# Patient Record
Sex: MALE | Race: WHITE | NOT HISPANIC OR LATINO | Employment: FULL TIME | URBAN - METROPOLITAN AREA
[De-identification: names, ages, dates, MRNs, and addresses within clinical notes are randomized per-mention and may not be internally consistent; named-entity substitution may affect disease eponyms.]

---

## 2021-06-01 ENCOUNTER — HOSPITAL ENCOUNTER (OUTPATIENT)
Dept: LAB | Facility: HOSPITAL | Age: 31
Discharge: HOME | End: 2021-06-01
Attending: STUDENT IN AN ORGANIZED HEALTH CARE EDUCATION/TRAINING PROGRAM

## 2021-06-01 ENCOUNTER — OFFICE VISIT CONVERTED (OUTPATIENT)
Dept: FAMILY MEDICINE CLINIC | Facility: CLINIC | Age: 31
End: 2021-06-01
Attending: STUDENT IN AN ORGANIZED HEALTH CARE EDUCATION/TRAINING PROGRAM

## 2021-06-01 LAB
ALBUMIN SERPL-MCNC: 4.5 G/DL (ref 3.5–5)
ALBUMIN/GLOB SERPL: 1.6 {RATIO} (ref 1.4–2.6)
ALP SERPL-CCNC: 93 U/L (ref 53–128)
ALT SERPL-CCNC: 24 U/L (ref 10–40)
ANION GAP SERPL CALC-SCNC: 11 MMOL/L (ref 8–19)
AST SERPL-CCNC: 19 U/L (ref 15–50)
BASOPHILS # BLD AUTO: 0.05 10*3/UL (ref 0–0.2)
BASOPHILS NFR BLD AUTO: 0.7 % (ref 0–3)
BILIRUB SERPL-MCNC: 0.5 MG/DL (ref 0.2–1.3)
BUN SERPL-MCNC: 15 MG/DL (ref 5–25)
BUN/CREAT SERPL: 15 {RATIO} (ref 6–20)
CALCIUM SERPL-MCNC: 9.3 MG/DL (ref 8.7–10.4)
CHLORIDE SERPL-SCNC: 104 MMOL/L (ref 99–111)
CHOLEST SERPL-MCNC: 191 MG/DL (ref 107–200)
CHOLEST/HDLC SERPL: 4.9 {RATIO} (ref 3–6)
CONV ABS IMM GRAN: 0.02 10*3/UL (ref 0–0.2)
CONV CO2: 25 MMOL/L (ref 22–32)
CONV IMMATURE GRAN: 0.3 % (ref 0–1.8)
CONV TOTAL PROTEIN: 7.3 G/DL (ref 6.3–8.2)
CREAT UR-MCNC: 0.99 MG/DL (ref 0.7–1.2)
DEPRECATED RDW RBC AUTO: 35.4 FL (ref 35.1–43.9)
EOSINOPHIL # BLD AUTO: 0.1 10*3/UL (ref 0–0.7)
EOSINOPHIL # BLD AUTO: 1.4 % (ref 0–7)
ERYTHROCYTE [DISTWIDTH] IN BLOOD BY AUTOMATED COUNT: 11.6 % (ref 11.6–14.4)
EST. AVERAGE GLUCOSE BLD GHB EST-MCNC: 100 MG/DL
GFR SERPLBLD BASED ON 1.73 SQ M-ARVRAT: >60 ML/MIN/{1.73_M2}
GLOBULIN UR ELPH-MCNC: 2.8 G/DL (ref 2–3.5)
GLUCOSE SERPL-MCNC: 79 MG/DL (ref 70–99)
HBA1C MFR BLD: 5.1 % (ref 3.5–5.7)
HCT VFR BLD AUTO: 46.7 % (ref 42–52)
HDLC SERPL-MCNC: 39 MG/DL (ref 40–60)
HGB BLD-MCNC: 16.2 G/DL (ref 14–18)
LDLC SERPL CALC-MCNC: 105 MG/DL (ref 70–100)
LYMPHOCYTES # BLD AUTO: 2.12 10*3/UL (ref 1–5)
LYMPHOCYTES NFR BLD AUTO: 29 % (ref 20–45)
MCH RBC QN AUTO: 29.5 PG (ref 27–31)
MCHC RBC AUTO-ENTMCNC: 34.7 G/DL (ref 33–37)
MCV RBC AUTO: 85.1 FL (ref 80–96)
MONOCYTES # BLD AUTO: 0.52 10*3/UL (ref 0.2–1.2)
MONOCYTES NFR BLD AUTO: 7.1 % (ref 3–10)
NEUTROPHILS # BLD AUTO: 4.5 10*3/UL (ref 2–8)
NEUTROPHILS NFR BLD AUTO: 61.5 % (ref 30–85)
NRBC CBCN: 0 % (ref 0–0.7)
OSMOLALITY SERPL CALC.SUM OF ELEC: 282 MOSM/KG (ref 273–304)
PLATELET # BLD AUTO: 249 10*3/UL (ref 130–400)
PMV BLD AUTO: 11.6 FL (ref 9.4–12.4)
POTASSIUM SERPL-SCNC: 4.2 MMOL/L (ref 3.5–5.3)
RBC # BLD AUTO: 5.49 10*6/UL (ref 4.7–6.1)
SODIUM SERPL-SCNC: 136 MMOL/L (ref 135–147)
TRIGL SERPL-MCNC: 233 MG/DL (ref 40–150)
TSH SERPL-ACNC: 1.43 M[IU]/L (ref 0.27–4.2)
VLDLC SERPL-MCNC: 47 MG/DL (ref 5–37)
WBC # BLD AUTO: 7.31 10*3/UL (ref 4.8–10.8)

## 2021-07-15 VITALS
TEMPERATURE: 97.4 F | WEIGHT: 198.31 LBS | HEIGHT: 70 IN | RESPIRATION RATE: 16 BRPM | OXYGEN SATURATION: 98 % | HEART RATE: 93 BPM | DIASTOLIC BLOOD PRESSURE: 82 MMHG | SYSTOLIC BLOOD PRESSURE: 120 MMHG | BODY MASS INDEX: 28.39 KG/M2

## 2021-10-21 ENCOUNTER — TELEPHONE (OUTPATIENT)
Dept: FAMILY MEDICINE CLINIC | Facility: CLINIC | Age: 31
End: 2021-10-21

## 2021-10-21 NOTE — TELEPHONE ENCOUNTER
----- Message from Mainor Momin sent at 10/20/2021  6:59 PM EDT -----  Regarding: Non-Urgent Medical Question  Contact: [phone]  I need to set up a new appointment and then talk to my doctor about getting a vasectomy.

## 2021-10-29 ENCOUNTER — OFFICE VISIT (OUTPATIENT)
Dept: FAMILY MEDICINE CLINIC | Facility: CLINIC | Age: 31
End: 2021-10-29

## 2021-10-29 VITALS
DIASTOLIC BLOOD PRESSURE: 72 MMHG | HEART RATE: 79 BPM | OXYGEN SATURATION: 97 % | SYSTOLIC BLOOD PRESSURE: 120 MMHG | BODY MASS INDEX: 27.04 KG/M2 | WEIGHT: 188.9 LBS | TEMPERATURE: 98.2 F | RESPIRATION RATE: 16 BRPM | HEIGHT: 70 IN

## 2021-10-29 DIAGNOSIS — R10.9 LEFT SIDED ABDOMINAL PAIN: ICD-10-CM

## 2021-10-29 DIAGNOSIS — Z30.09 VASECTOMY EVALUATION: Primary | ICD-10-CM

## 2021-10-29 DIAGNOSIS — R82.90 FOUL SMELLING URINE: ICD-10-CM

## 2021-10-29 PROCEDURE — 87086 URINE CULTURE/COLONY COUNT: CPT | Performed by: STUDENT IN AN ORGANIZED HEALTH CARE EDUCATION/TRAINING PROGRAM

## 2021-10-29 PROCEDURE — 99213 OFFICE O/P EST LOW 20 MIN: CPT | Performed by: STUDENT IN AN ORGANIZED HEALTH CARE EDUCATION/TRAINING PROGRAM

## 2021-10-29 NOTE — PROGRESS NOTES
"Chief Complaint  Patient comes to the clinic to discuss about vasectomy, left side abdominal discomfort and possible smelly urine    Subjective         Mainor Momin is a 31 y.o. male who presents to [facility]  31 years old male with past medical history of left lower abdominal discomfort, intermittent in nature, denies any constipation/diarrhea/bulging mass/scrotal discomfort/urinary symptoms/nausea/vomiting/fever.  Patient reports this has been going on since very long time.    Patient would like urology referral for vasectomy.    Patient also reports intermittent noticing foul-smelling urine, denies any urinary discharge or any frequency changes.  Denies any abdominal pain/blood in urine.    Patient denies any other complaint at this time.    Review of Systems   Objective   Vital Signs:   Vitals:    10/29/21 1618   BP: 120/72   Pulse: 79   Resp: 16   Temp: 98.2 °F (36.8 °C)   SpO2: 97%   Weight: 85.7 kg (188 lb 14.4 oz)   Height: 177.8 cm (70\")      Body mass index is 27.1 kg/m².   Physical Exam  Vitals reviewed.   Constitutional:       Appearance: Normal appearance. He is well-developed.   HENT:      Head: Normocephalic and atraumatic.      Right Ear: External ear normal.      Left Ear: External ear normal.      Mouth/Throat:      Pharynx: No oropharyngeal exudate.   Eyes:      Conjunctiva/sclera: Conjunctivae normal.      Pupils: Pupils are equal, round, and reactive to light.   Cardiovascular:      Rate and Rhythm: Normal rate and regular rhythm.      Heart sounds: No murmur heard.  No friction rub. No gallop.    Pulmonary:      Effort: Pulmonary effort is normal.      Breath sounds: Normal breath sounds. No wheezing or rhonchi.   Abdominal:      General: Bowel sounds are normal. There is no distension.      Palpations: Abdomen is soft.      Tenderness: There is no abdominal tenderness.   Skin:     General: Skin is warm and dry.   Neurological:      Mental Status: He is " alert and oriented to person, place, and time.      Cranial Nerves: No cranial nerve deficit.   Psychiatric:         Mood and Affect: Mood and affect normal.         Behavior: Behavior normal.         Thought Content: Thought content normal.         Judgment: Judgment normal.                       Assessment and Plan   Diagnoses and all orders for this visit:    1. Vasectomy evaluation (Primary)  -     Ambulatory Referral to Urology    2. Left sided abdominal pain  -     US Soft Tissue; Future    3. Foul smelling urine  -     Cancel: Urine Culture - Urine, Urine, Clean Catch; Future  -     Urine Culture - Urine, Urine, Clean Catch        For abdominal discomfort; completely benign physical examination.  Due to patient's concern, I will go ahead and order soft tissue ultrasound to rule out any abdominal hernia.  Patient to call me if any worsening or changes with symptoms and may even consider CT scan or GI referral.    Urology for vasectomy evaluation.  We will follow-up urine culture.    Follow Up   Return in about 1 year (around 10/29/2022), or if symptoms worsen or fail to improve.  Patient was given instructions and counseling regarding his condition or for health maintenance advice. Please see specific information pulled into the AVS if appropriate.       Answers for HPI/ROS submitted by the patient on 10/29/2021  What is the primary reason for your visit?: Other  Please describe your symptoms.: Pain in left side of stomach. Also noticed my urine smelling.  Have you had these symptoms before?: Yes  How long have you been having these symptoms?: Greater than 2 weeks  Please describe any probable cause for these symptoms. : Spicy foods.

## 2021-10-30 LAB — BACTERIA SPEC AEROBE CULT: NO GROWTH

## 2021-11-11 ENCOUNTER — TELEPHONE (OUTPATIENT)
Dept: FAMILY MEDICINE CLINIC | Facility: CLINIC | Age: 31
End: 2021-11-11

## 2021-11-11 ENCOUNTER — HOSPITAL ENCOUNTER (OUTPATIENT)
Dept: ULTRASOUND IMAGING | Facility: HOSPITAL | Age: 31
Discharge: HOME | End: 2021-11-11
Admitting: STUDENT IN AN ORGANIZED HEALTH CARE EDUCATION/TRAINING PROGRAM

## 2021-11-11 DIAGNOSIS — K40.90 UNILATERAL INGUINAL HERNIA WITHOUT OBSTRUCTION OR GANGRENE, RECURRENCE NOT SPECIFIED: Primary | ICD-10-CM

## 2021-11-11 DIAGNOSIS — R10.9 LEFT SIDED ABDOMINAL PAIN: ICD-10-CM

## 2021-11-11 PROCEDURE — 76999 ECHO EXAMINATION PROCEDURE: CPT

## 2021-11-11 NOTE — TELEPHONE ENCOUNTER
----- Message from Antony Anderson MD sent at 11/11/2021  4:59 PM EST -----  Your ultrasound shows possible small left inguinal hernia and recommending CT scan for further evaluation.  I have already ordered the CT scan, expect a call in 1 to 2 weeks.

## 2021-12-02 ENCOUNTER — HOSPITAL ENCOUNTER (OUTPATIENT)
Dept: CT IMAGING | Facility: HOSPITAL | Age: 31
End: 2021-12-02

## 2021-12-03 ENCOUNTER — HOSPITAL ENCOUNTER (OUTPATIENT)
Dept: CT IMAGING | Facility: HOSPITAL | Age: 31
Discharge: HOME | End: 2021-12-03
Admitting: STUDENT IN AN ORGANIZED HEALTH CARE EDUCATION/TRAINING PROGRAM

## 2021-12-03 DIAGNOSIS — K40.90 UNILATERAL INGUINAL HERNIA WITHOUT OBSTRUCTION OR GANGRENE, RECURRENCE NOT SPECIFIED: ICD-10-CM

## 2021-12-03 PROCEDURE — 0 IOPAMIDOL PER 1 ML: Performed by: STUDENT IN AN ORGANIZED HEALTH CARE EDUCATION/TRAINING PROGRAM

## 2021-12-03 PROCEDURE — 74177 CT ABD & PELVIS W/CONTRAST: CPT

## 2021-12-03 RX ADMIN — IOPAMIDOL 100 ML: 755 INJECTION, SOLUTION INTRAVENOUS at 16:03

## 2022-01-10 ENCOUNTER — TELEPHONE (OUTPATIENT)
Dept: UROLOGY | Facility: CLINIC | Age: 32
End: 2022-01-10

## 2022-01-10 NOTE — TELEPHONE ENCOUNTER
LMOM TO RETURN CALL AND RESCHEDULE APPT TO 11/14/22 @8:30, IF UNABLE TO THEN MOVE APPT TO NEXT AVAILABLE

## 2022-02-08 ENCOUNTER — TELEPHONE (OUTPATIENT)
Dept: FAMILY MEDICINE CLINIC | Facility: CLINIC | Age: 32
End: 2022-02-08

## 2022-02-08 NOTE — TELEPHONE ENCOUNTER
Pt was called multiple times by  and MA pt did not answer pt called 15 minutes after appointment time and was told it may not be soon if we are able to see him because we were with next pt. He told  if it wasn't the next 10 minutes he would have to reschedule. I did try to call again once dr rooney was ready but no answer.

## 2022-02-11 ENCOUNTER — TELEMEDICINE (OUTPATIENT)
Dept: FAMILY MEDICINE CLINIC | Facility: CLINIC | Age: 32
End: 2022-02-11

## 2022-02-11 DIAGNOSIS — R19.8 BOWEL MOVEMENT SYMPTOM: ICD-10-CM

## 2022-02-11 DIAGNOSIS — R10.32 ABDOMINAL DISCOMFORT IN LEFT LOWER QUADRANT: Primary | ICD-10-CM

## 2022-02-11 PROCEDURE — 99213 OFFICE O/P EST LOW 20 MIN: CPT | Performed by: STUDENT IN AN ORGANIZED HEALTH CARE EDUCATION/TRAINING PROGRAM

## 2022-02-11 NOTE — PROGRESS NOTES
Chief Complaint  Requesting GI referral    You have chosen to receive care through a telephone visit. Do you consent to use a telephone visit for your medical care today? YES    This was an audio and video enabled telemedicine encounter.    Subjective         Mainor Momin is a 31 y.o. male who presents to [facility]    Telehealth visit was done for 31 years old male, patient is requesting for GI referral for left lower abdominal discomfort/intermittent, small bowel movements and upset stomach after eating spicy food.    Patient was seen for the symptoms last year, had blood work, abdominal ultrasound and CT scan done without any abnormal findings.  Patient reports that his symptoms are not every day but he would like to see GI for possible colonoscopy or further evaluation.  Reports no blood with stool/nausea/vomiting.  Patient reports that he feels sharp pain on the left side after eating spicy food.    Review of Systems   Objective   Vital Signs:   There were no vitals filed for this visit.   There is no height or weight on file to calculate BMI.   Physical Exam  Constitutional:       General: He is awake.   Neurological:      Mental Status: He is alert.   Psychiatric:         Behavior: Behavior is cooperative.                 Assessment and Plan   Diagnoses and all orders for this visit:    1. Abdominal discomfort in left lower quadrant (Primary)  -     Ambulatory Referral to Gastroenterology    2. Bowel movement symptom  -     Ambulatory Referral to Gastroenterology      After reviewing patient's symptoms and benign exam in last visit; I have discussed possibilities of differential diagnosis.  I have also reviewed benign abdominal ultrasound/CT scan with patient's.    Due to patient's concerns and persistent symptoms; I will go ahead and consult GI for further evaluation as patient requested.    Total time spent; 10 minutes      Follow Up   Return if symptoms worsen or fail  to improve.  Patient was given instructions and counseling regarding his condition or for health maintenance advice. Please see specific information pulled into the AVS if appropriate.

## 2022-03-01 ENCOUNTER — OFFICE VISIT (OUTPATIENT)
Dept: UROLOGY | Facility: CLINIC | Age: 32
End: 2022-03-01

## 2022-03-01 VITALS — HEIGHT: 70 IN | BODY MASS INDEX: 26.92 KG/M2 | WEIGHT: 188 LBS | RESPIRATION RATE: 18 BRPM

## 2022-03-01 DIAGNOSIS — Z30.09 STERILIZATION CONSULT: Primary | ICD-10-CM

## 2022-03-01 PROCEDURE — 99203 OFFICE O/P NEW LOW 30 MIN: CPT | Performed by: UROLOGY

## 2022-03-01 NOTE — PROGRESS NOTES
Chief Complaint: Undesired fertility         History of Present Illness:  Mainor Momin is a 31 y.o. male presents for counseling regarding vasectomy for permanent sterilization. The procedure was discussed with the patient. Mainor Momin is aware that the procedure should be considered irreversible, although at times it can be reversed with success. Risks for the procedure including local effects of swelling, bleeding, pain, the possibility for recanalization, and continued fertility is also possible. Formation of a sperm granuloma also is a possibility. We discussed the procedure, preoperative preparation, and postoperative care. We also discussed the importance of continuing to use contraception post vasectomy, since the patient will not be sterile at that point. We stressed the importance of continuing to use contraception until a follow-up semen specimen is done that shows the absence of sperm. That specimen will normally be obtained eight weeks post-surgery. Mainor Momin is voluntarily requesting the procedure and understands that if it is successful he will be unable to father children.     No anticoagulation, no previous scrotal surgery, no cardiopulmonary history    Alert and oriented x3  No acute distress  Unlabored respirations  Nontender/nondistended  Normal circumcised phallus, bilateral descended testicles without mass.  Bilateral vas deferens palpable  Grossly oriented to person place and time judgment is intact      Assessment and Plan      Consult for sterilization      Plan    Instructions  • The patient will schedule a vasectomy if he desires.   • Handouts were provided, vasectomy  scanned into the EMR for reference.   • Vasectomy is intended to be a permanent form of contraception.   • Vasectomy does not produce immediate sterility.   • Following vasectomy, another form of contraception is required until vas occlusion is confirmed by post-vasectomy semen analysis (PVSA).    • Even after vas occlusion is confirmed, vasectomy is not 100% reliable in preventing pregnancy.   • The risk of pregnancy after vasectomy is approximately 1 in 2,000 for men who have no sperm in the semen on post-vasectomy semen analysis showing rare non-motile sperm (RNMS).   • Repeat vasectomy is necessary in <1% of vasectomies, provided that a technique for vas occlusion known to have low occlusive failure rate has been used.   • Patient's should refrain from ejaculation for approximately 1 week after vasectomy.   • Options for fertility after vasectomy reversal and sperm retrieval with in vitro fertilization. These options are not always successful, and are very expensive.   • The rates of surgical complications such as symptomatic hematoma and infection are 1-2%  • Chronic scrotal pain associated with negative impact on quality of life occurs after vasectomy in about 1-2% of men. Few of these men require additional surgery.   • Other permanent and non-permanent alternatives to vasectomy are available.  • Patient voiced understanding of the above statements.   • Electronically identified patient education materials provided electronically    Patient has decided to schedule a vasectomy.

## 2022-03-18 ENCOUNTER — OFFICE VISIT (OUTPATIENT)
Dept: UROLOGY | Facility: CLINIC | Age: 32
End: 2022-03-18

## 2022-03-18 DIAGNOSIS — Z30.2 STERILIZATION: Primary | ICD-10-CM

## 2022-03-18 PROCEDURE — 55250 REMOVAL OF SPERM DUCT(S): CPT | Performed by: UROLOGY

## 2022-03-18 NOTE — PROGRESS NOTES
Vasectomy Procedure    Procedure: Vasectomy     Pre-procedure Diagnosis: Undesired Fertility    Post-procedure Diagnosis: Same    Surgeon: Charles Doyle MD    Anesthesia: Local, 10 cc of 1% Lidocaine    Indications  with undesired fertility, who presents today for vasectomy for permanent contraception.     Procedure Details:     The patient was appropriately identified, brought into the procedure room, and placed supine on the procedure table. A time was undertaken documenting the correct patient, site and procedure. His scrotum was prepped and draped in the standard sterile fashion. We first approached the right vas deferens. This was identified,  from the spermatic cord structures, and brought to the anterolateral aspect of the hemiscrotum. The local anaesthetic solution was injected and once an adequate level of local anesthesia was achieved, a scalpel was used to make a 1 cm incision in the skin overlying the vas deferens at the midline. A sharp hemostat was used to dissect the level of the vas deferens and on both of its sides, allowing for ring forceps to be introduced and grasp the vas deferens and externalize it through the skin incision. We then used a combination of sharp and blunt dissection to release the exposed vasal segment from all surrounding tissues. An approximately 1 cm piece of vas deferens was then sharply excised and removed. One blade of a sharp hemostat was used to probe each end of the severed vas deferens, confirming the presence of a vasal lumen. Electrocautery was then used to fulgurate both cut surfaces of the severed vas deferens. The abdominal side of the vas deferens was then placed underneath some perivasal tissues that were closed above it, using a figure-of-eight 3-0 chromic stitch, thus placing the two edges of the severed vas deferens in different tissue planes. Hemostasis was confirmed and the severed vas deferens was allowed to drop back into the scrotum.  We then  turned our attention to the left vas deferens. This was also identified and brought under the same midline incision. Local anesthetic was then delivered on this side around the vas deferens. An identical procedure was then carried out on the left vas deferens. Wound was dressed with bacitracin ointment and folded 4x4 gauze. The patient tolerated the procedure well and there were no intraoperative or immediate postoperative complications.     No intraprocedural complications    Blood loss 000    Plan:    1. Continue contraception until negative sperm analysis. Semen count in 10 weeks  2. Warning signs of infection were reviewed.   3. Patient is taken home by  with written home care instructions.  • Bedrest X 48 hrs, Ice pack every 3 hours for 24 hrs.    • Call the clinic if excessive pain, bleeding or swelling.  • Light duty for 2 weeks    Patient voiced understanding.    Electronically Signed by Charles Doyle MD on 03/18/2022

## 2022-04-27 ENCOUNTER — PREP FOR SURGERY (OUTPATIENT)
Dept: OTHER | Facility: HOSPITAL | Age: 32
End: 2022-04-27

## 2022-04-27 ENCOUNTER — OFFICE VISIT (OUTPATIENT)
Dept: GASTROENTEROLOGY | Facility: CLINIC | Age: 32
End: 2022-04-27

## 2022-04-27 VITALS
SYSTOLIC BLOOD PRESSURE: 130 MMHG | WEIGHT: 198.2 LBS | BODY MASS INDEX: 28.37 KG/M2 | HEIGHT: 70 IN | OXYGEN SATURATION: 98 % | DIASTOLIC BLOOD PRESSURE: 85 MMHG | HEART RATE: 74 BPM

## 2022-04-27 DIAGNOSIS — R10.32 LEFT LOWER QUADRANT PAIN: Primary | ICD-10-CM

## 2022-04-27 DIAGNOSIS — R19.4 CHANGE IN BOWEL HABITS: ICD-10-CM

## 2022-04-27 DIAGNOSIS — K59.00 CONSTIPATION, UNSPECIFIED CONSTIPATION TYPE: ICD-10-CM

## 2022-04-27 PROCEDURE — 99204 OFFICE O/P NEW MOD 45 MIN: CPT

## 2022-04-27 RX ORDER — ALUMINUM ZIRCONIUM OCTACHLOROHYDREX GLY 16 G/100G
GEL TOPICAL DAILY
Qty: 283 G | Refills: 3 | Status: SHIPPED | OUTPATIENT
Start: 2022-04-27 | End: 2022-05-27

## 2022-04-27 RX ORDER — POLYETHYLENE GLYCOL 3350, SODIUM CHLORIDE, SODIUM BICARBONATE, POTASSIUM CHLORIDE 420; 11.2; 5.72; 1.48 G/4L; G/4L; G/4L; G/4L
4000 POWDER, FOR SOLUTION ORAL ONCE
Qty: 4000 ML | Refills: 0 | Status: SHIPPED | OUTPATIENT
Start: 2022-04-27 | End: 2022-04-27

## 2022-04-27 RX ORDER — DOCUSATE SODIUM 100 MG/1
100 CAPSULE, LIQUID FILLED ORAL DAILY
Qty: 90 CAPSULE | Refills: 0 | Status: SHIPPED | OUTPATIENT
Start: 2022-04-27 | End: 2022-07-21

## 2022-04-27 NOTE — PROGRESS NOTES
Chief Complaint  Abdominal Pain (LLQ/) and Constipation    Mainor Momin is a 31 y.o. male who presents to [facility] on referral from Antony Anderson MD for a gastroenterology evaluation of left lower quadrant pain.      History of Present Illness  New patient presents to the office for left lower quadrant pain and constipation. Over the last year he has noticed LLQ pain with certain foods and is having constipation which is new for him. He admits to straining with bowel movements.  Patient has not taken anything over-the-counter to manage constipation.  Denies diarrhea, melena, and hematochezia.  Denies family history of colon cancer.  Patient wishes to proceed with colonoscopy for thorough work-up.  Denies upper GI symptoms such as heartburn, nausea, vomiting, dysphagia, and epigastric pain.  Denies unintentional weight loss.    CT abdomen and pelvis with contrast 12/30/2021 showed no acute process identified within the abdomen/pelvis    Past Medical History:   Diagnosis Date   • Acid reflux disease    • Allergies    • Eczema        History reviewed. No pertinent surgical history.      Current Outpatient Medications:   •  docusate sodium (Colace) 100 MG capsule, Take 1 capsule by mouth Daily for 90 days., Disp: 90 capsule, Rfl: 0  •  polyethylene glycol-electrolytes (Nulytely with Flavor Packs) 420 g solution, Take 4,000 mL by mouth 1 (One) Time for 1 dose., Disp: 4000 mL, Rfl: 0  •  psyllium (Metamucil Smooth Texture) 58.6 % powder, Take  by mouth Daily for 30 days., Disp: 283 g, Rfl: 3     Allergies   Allergen Reactions   • Codeine Hives       Family History   Problem Relation Age of Onset   • Anemia Mother    • Colon cancer Neg Hx         Social History     Social History Narrative   • Not on file       Immunization:  Immunization History   Administered Date(s) Administered   • Hep B, Adolescent or Pediatric 08/25/2003, 04/13/2004   • Td 08/25/2003   • Tdap  "09/01/2017        Objective     Vital Signs:   /85 (BP Location: Left arm, Patient Position: Sitting, Cuff Size: Adult)   Pulse 74   Ht 177.8 cm (70\")   Wt 89.9 kg (198 lb 3.2 oz)   SpO2 98%   BMI 28.44 kg/m²       Physical Exam  Constitutional:       Appearance: Normal appearance.   HENT:      Head: Normocephalic.   Cardiovascular:      Rate and Rhythm: Normal rate and regular rhythm.      Heart sounds: Normal heart sounds.   Pulmonary:      Effort: Pulmonary effort is normal.      Breath sounds: Normal breath sounds.   Abdominal:      General: Bowel sounds are normal.      Palpations: Abdomen is soft.   Skin:     General: Skin is warm and dry.   Neurological:      Mental Status: He is alert and oriented to person, place, and time. Mental status is at baseline.   Psychiatric:         Mood and Affect: Mood normal.         Behavior: Behavior normal.         Thought Content: Thought content normal.         Judgment: Judgment normal.         Result Review :               Assessment and Plan    Diagnoses and all orders for this visit:    1. Left lower quadrant pain (Primary)    2. Change in bowel habits    3. Constipation, unspecified constipation type    Other orders  -     psyllium (Metamucil Smooth Texture) 58.6 % powder; Take  by mouth Daily for 30 days.  Dispense: 283 g; Refill: 3  -     docusate sodium (Colace) 100 MG capsule; Take 1 capsule by mouth Daily for 90 days.  Dispense: 90 capsule; Refill: 0  -     polyethylene glycol-electrolytes (Nulytely with Flavor Packs) 420 g solution; Take 4,000 mL by mouth 1 (One) Time for 1 dose.  Dispense: 4000 mL; Refill: 0      31-year-old male presents to the office with change in bowel habits to constipation and left lower quadrant pain that is intermittent.  Left lower quadrant pain occurs a couple times a week.  Symptoms are exacerbated by  spicy foods.  For constipation patient will add a fiber supplement to his daily regimen.  If this does not adequately " relieve constipation then he will try a stool softener.  We will schedule patient for colonoscopy at his earliest convenience.  Patient is agreeable plan will call the office any questions or concerns.    COLONOSCOPY Surgical Risk and Benefits: Possible risk/complications, benefits, and alternatives to surgical or invasive procedure have been explained to patient and/or legal guardian. Risks include bleeding, infection, and perforation. Patient has been evaluated and can tolerate anesthesia and/or sedation. Risk, benefits, and alternatives to anesthesia and sedation have been explained to patient and/or legal guardian.     Follow Up   No follow-ups on file.  Patient was given instructions and counseling regarding his condition or for health maintenance advice. Please see specific information pulled into the AVS if appropriate.

## 2022-06-03 ENCOUNTER — OFFICE VISIT (OUTPATIENT)
Dept: UROLOGY | Facility: CLINIC | Age: 32
End: 2022-06-03

## 2022-06-03 DIAGNOSIS — Z30.2 STERILIZATION: Primary | ICD-10-CM

## 2022-06-03 PROCEDURE — 99024 POSTOP FOLLOW-UP VISIT: CPT | Performed by: UROLOGY

## 2022-06-03 NOTE — PROGRESS NOTES
Subjective         History of Present Illness:  Mainor Momin is a 31 y.o. male who is here status post vasectomy. 0 sperm noted on a #20 power fields.         Assessment and Plan     Undesired fertility    Medications  • Medications have been reconciled.       Instructions    [x]   Instructed patient to follow-up as needed, counseled that he is okay to stop using birth control.    []   Patient to follow-up in 1 month for recheck, patient counseled to continue use birth control as he is still considered fertile and able to father children until recheck and given the okay to stop using birth control at that time. Patient voiced understanding.           Charles Dolye MD

## 2022-07-22 ENCOUNTER — HOSPITAL ENCOUNTER (OUTPATIENT)
Facility: HOSPITAL | Age: 32
Setting detail: HOSPITAL OUTPATIENT SURGERY
Discharge: HOME | End: 2022-07-22
Attending: INTERNAL MEDICINE | Admitting: INTERNAL MEDICINE

## 2022-07-22 ENCOUNTER — ANESTHESIA EVENT (OUTPATIENT)
Dept: GASTROENTEROLOGY | Facility: HOSPITAL | Age: 32
End: 2022-07-22

## 2022-07-22 ENCOUNTER — ANESTHESIA (OUTPATIENT)
Dept: GASTROENTEROLOGY | Facility: HOSPITAL | Age: 32
End: 2022-07-22

## 2022-07-22 VITALS
WEIGHT: 190.04 LBS | DIASTOLIC BLOOD PRESSURE: 81 MMHG | HEART RATE: 72 BPM | HEIGHT: 70 IN | SYSTOLIC BLOOD PRESSURE: 113 MMHG | BODY MASS INDEX: 27.21 KG/M2 | TEMPERATURE: 97.3 F | OXYGEN SATURATION: 97 % | RESPIRATION RATE: 16 BRPM

## 2022-07-22 PROCEDURE — 25010000002 PROPOFOL 10 MG/ML EMULSION: Performed by: NURSE ANESTHETIST, CERTIFIED REGISTERED

## 2022-07-22 PROCEDURE — 45378 DIAGNOSTIC COLONOSCOPY: CPT | Performed by: INTERNAL MEDICINE

## 2022-07-22 RX ORDER — SODIUM CHLORIDE, SODIUM LACTATE, POTASSIUM CHLORIDE, CALCIUM CHLORIDE 600; 310; 30; 20 MG/100ML; MG/100ML; MG/100ML; MG/100ML
30 INJECTION, SOLUTION INTRAVENOUS CONTINUOUS
Status: DISCONTINUED | OUTPATIENT
Start: 2022-07-22 | End: 2022-07-22 | Stop reason: HOSPADM

## 2022-07-22 RX ORDER — PROPOFOL 10 MG/ML
VIAL (ML) INTRAVENOUS AS NEEDED
Status: DISCONTINUED | OUTPATIENT
Start: 2022-07-22 | End: 2022-07-22 | Stop reason: SURG

## 2022-07-22 RX ORDER — LIDOCAINE HYDROCHLORIDE 20 MG/ML
INJECTION, SOLUTION EPIDURAL; INFILTRATION; INTRACAUDAL; PERINEURAL AS NEEDED
Status: DISCONTINUED | OUTPATIENT
Start: 2022-07-22 | End: 2022-07-22 | Stop reason: SURG

## 2022-07-22 RX ORDER — POLYETHYLENE GLYCOL 3350 17 G/17G
17 POWDER, FOR SOLUTION ORAL DAILY
Qty: 30 PACKET | Refills: 11 | Status: SHIPPED | OUTPATIENT
Start: 2022-07-22

## 2022-07-22 RX ADMIN — PROPOFOL 300 MCG/KG/MIN: 10 INJECTION, EMULSION INTRAVENOUS at 14:16

## 2022-07-22 RX ADMIN — SODIUM CHLORIDE, POTASSIUM CHLORIDE, SODIUM LACTATE AND CALCIUM CHLORIDE: 600; 310; 30; 20 INJECTION, SOLUTION INTRAVENOUS at 14:14

## 2022-07-22 RX ADMIN — PROPOFOL 50 MG: 10 INJECTION, EMULSION INTRAVENOUS at 14:16

## 2022-07-22 RX ADMIN — LIDOCAINE HYDROCHLORIDE 100 MG: 20 INJECTION, SOLUTION EPIDURAL; INFILTRATION; INTRACAUDAL; PERINEURAL at 14:16

## 2022-07-22 RX ADMIN — SODIUM CHLORIDE, POTASSIUM CHLORIDE, SODIUM LACTATE AND CALCIUM CHLORIDE 30 ML/HR: 600; 310; 30; 20 INJECTION, SOLUTION INTRAVENOUS at 12:42

## 2022-07-22 NOTE — ANESTHESIA PREPROCEDURE EVALUATION
Anesthesia Evaluation     Patient summary reviewed and Nursing notes reviewed   no history of anesthetic complications:  NPO Solid Status: > 8 hours  NPO Liquid Status: > 2 hours           Airway   Mallampati: II  TM distance: >3 FB  Neck ROM: full  No difficulty expected  Dental      Pulmonary - negative pulmonary ROS and normal exam    breath sounds clear to auscultation  Cardiovascular - negative cardio ROS and normal exam  Exercise tolerance: good (4-7 METS)    Rhythm: regular  Rate: normal        Neuro/Psych- negative ROS  GI/Hepatic/Renal/Endo    (+)  GERD,      Musculoskeletal (-) negative ROS    Abdominal    Substance History - negative use     OB/GYN negative ob/gyn ROS         Other - negative ROS                       Anesthesia Plan    ASA 2     general     (Total IV Anesthesia    Patient understands anesthesia not responsible for dental damage.  )  intravenous induction     Anesthetic plan, risks, benefits, and alternatives have been provided, discussed and informed consent has been obtained with: patient.    Plan discussed with CRNA.        CODE STATUS:

## 2022-07-22 NOTE — ANESTHESIA POSTPROCEDURE EVALUATION
Patient: Mainor Momin    Procedure Summary     Date: 07/22/22 Room / Location: [facility] ENDOSCOPY 2 / [facility] ENDOSCOPY    Anesthesia Start: 1414 Anesthesia Stop: 1442    Procedure: COLONOSCOPY (N/A ) Diagnosis:       Left lower quadrant pain      Change in bowel habits      Constipation, unspecified constipation type      (Left lower quadrant pain [R10.32])      (Change in bowel habits [R19.4])      (Constipation, unspecified constipation type [K59.00])    Surgeons: Socrates Amaral MD Provider: Zeb Mustafa MD    Anesthesia Type: general ASA Status: 2          Anesthesia Type: general    Vitals  Vitals Value Taken Time   BP 89/56 07/22/22 1452   Temp 36.3 °C (97.3 °F) 07/22/22 1442   Pulse 56 07/22/22 1453   Resp 16 07/22/22 1445   SpO2 95 % 07/22/22 1453   Vitals shown include unvalidated device data.        Post Anesthesia Care and Evaluation    Patient location during evaluation: bedside  Patient participation: complete - patient participated  Level of consciousness: awake  Pain management: adequate    Airway patency: patent  Anesthetic complications: No anesthetic complications  PONV Status: none  Cardiovascular status: acceptable and stable  Respiratory status: acceptable and room air  Hydration status: acceptable    Comments: An Anesthesiologist personally participated in the most demanding procedures (including induction and emergence if applicable) in the anesthesia plan, monitored the course of anesthesia administration at frequent intervals and remained physically present and available for immediate diagnosis and treatment of emergencies.

## 2022-07-22 NOTE — H&P
"Pre Procedure History & Physical    Chief Complaint:   llq pain  Constipation  Change in bowel habits    Subjective     HPI:   As above    Past Medical History:   Past Medical History:   Diagnosis Date   • Acid reflux disease    • Allergies    • Eczema        Past Surgical History:  Past Surgical History:   Procedure Laterality Date   • VASECTOMY         Family History:  Family History   Problem Relation Age of Onset   • Anemia Mother    • Colon cancer Neg Hx    • Malig Hyperthermia Neg Hx        Social History:   reports that he has never smoked. He has never used smokeless tobacco. He reports that he does not drink alcohol and does not use drugs.    Medications:   No medications prior to admission.       Allergies:  Codeine        Objective     Blood pressure 114/71, pulse 72, temperature 97.4 °F (36.3 °C), temperature source Temporal, resp. rate 16, height 177.9 cm (70.04\"), weight 86.2 kg (190 lb 0.6 oz), SpO2 98 %.    Physical Exam   Constitutional: Pt is oriented to person, place, and time and well-developed, well-nourished, and in no distress.   Mouth/Throat: Oropharynx is clear and moist.   Neck: Normal range of motion.   Cardiovascular: Normal rate, regular rhythm and normal heart sounds.    Pulmonary/Chest: Effort normal and breath sounds normal.   Abdominal: Soft. Nontender  Skin: Skin is warm and dry.   Psychiatric: Mood, memory, affect and judgment normal.     Assessment & Plan     Diagnosis:  llq pain  Constipation  Change in bowel habits      Anticipated Surgical Procedure:  egd  colonoscopy    The risks, benefits, and alternatives of this procedure have been discussed with the patient or the responsible party- the patient understands and agrees to proceed.            "

## 2022-07-26 ENCOUNTER — TELEPHONE (OUTPATIENT)
Dept: GASTROENTEROLOGY | Facility: CLINIC | Age: 32
End: 2022-07-26

## 2022-07-26 NOTE — TELEPHONE ENCOUNTER
I have reviewed the patients colonoscopy report. The report showed a normal colonoscopy.  No polyps removed or specimens collected.  Patient has follow up appointment scheduled 9/22/22

## 2022-08-18 ENCOUNTER — OFFICE VISIT (OUTPATIENT)
Dept: FAMILY MEDICINE CLINIC | Facility: CLINIC | Age: 32
End: 2022-08-18

## 2022-08-18 VITALS
RESPIRATION RATE: 19 BRPM | DIASTOLIC BLOOD PRESSURE: 78 MMHG | OXYGEN SATURATION: 97 % | TEMPERATURE: 97.8 F | HEART RATE: 81 BPM | WEIGHT: 194.1 LBS | SYSTOLIC BLOOD PRESSURE: 120 MMHG | BODY MASS INDEX: 27.79 KG/M2 | HEIGHT: 70 IN

## 2022-08-18 DIAGNOSIS — Z11.59 NEED FOR HEPATITIS C SCREENING TEST: ICD-10-CM

## 2022-08-18 DIAGNOSIS — G43.709 CHRONIC MIGRAINE WITHOUT AURA WITHOUT STATUS MIGRAINOSUS, NOT INTRACTABLE: ICD-10-CM

## 2022-08-18 DIAGNOSIS — G43.109 OCULAR MIGRAINE: ICD-10-CM

## 2022-08-18 DIAGNOSIS — Z00.00 ANNUAL PHYSICAL EXAM: Primary | ICD-10-CM

## 2022-08-18 PROCEDURE — 99395 PREV VISIT EST AGE 18-39: CPT | Performed by: STUDENT IN AN ORGANIZED HEALTH CARE EDUCATION/TRAINING PROGRAM

## 2022-08-18 PROCEDURE — 99213 OFFICE O/P EST LOW 20 MIN: CPT | Performed by: STUDENT IN AN ORGANIZED HEALTH CARE EDUCATION/TRAINING PROGRAM

## 2022-08-18 RX ORDER — AMITRIPTYLINE HYDROCHLORIDE 25 MG/1
25 TABLET, FILM COATED ORAL NIGHTLY
Qty: 30 TABLET | Refills: 1 | Status: SHIPPED | OUTPATIENT
Start: 2022-08-18 | End: 2022-11-16

## 2022-08-18 NOTE — PROGRESS NOTES
"Chief Complaint  Annual physical and follow-up on uncontrolled migraines    Subjective         Mainor Momin is a 32 y.o. male who presents to [facility]    32 years old male comes to the clinic today for annual physical and follow-up on migraines.    Patient does report chronic migraines which has gotten worse in last 6 months.  Patient has reported 10 episodes of significantly bad migraines in the last 6 to 9 months.  Reports 1 episodes of nausea and vomiting with that bad migraine episode.  Usually Excedrin works great for him, usually takes about an hour before it improves.  Positive photophobia and phonophobia with those episodes, usually located on 1 side of the head.    Patient was seen by ophthalmologist due to blurry vision with those migraine episodes and was diagnosed with ocular migraine.  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    Patient was told by work to have FMLA paperwork ready for his symptoms.    Denies any chest pain or shortness of breath on exertion.    Objective   Vital Signs:   Vitals:    08/18/22 0743   BP: 120/78   Pulse: 81   Resp: 19   Temp: 97.8 °F (36.6 °C)   SpO2: 97%   Weight: 88 kg (194 lb 1.6 oz)   Height: 177.9 cm (70.04\")      Body mass index is 27.82 kg/m².   Wt Readings from Last 3 Encounters:   08/18/22 88 kg (194 lb 1.6 oz)   07/22/22 86.2 kg (190 lb 0.6 oz)   04/27/22 89.9 kg (198 lb 3.2 oz)      BP Readings from Last 3 Encounters:   08/18/22 120/78   07/22/22 113/81   04/27/22 130/85        Patient Care Team:  Antony Anderson MD as PCP - General (Family Medicine)  Charles Doyle MD as Consulting Physician (Urology)     Physical Exam  Vitals reviewed.   Constitutional:       Appearance: Normal appearance. He is well-developed.   HENT:      Head: Normocephalic and atraumatic.      Right Ear: External ear normal.      Left Ear: External ear normal.      Mouth/Throat:      Pharynx: No " oropharyngeal exudate.   Eyes:      Conjunctiva/sclera: Conjunctivae normal.      Pupils: Pupils are equal, round, and reactive to light.   Cardiovascular:      Rate and Rhythm: Normal rate and regular rhythm.      Heart sounds: No murmur heard.    No friction rub. No gallop.   Pulmonary:      Effort: Pulmonary effort is normal.      Breath sounds: Normal breath sounds. No wheezing or rhonchi.   Abdominal:      General: Bowel sounds are normal. There is no distension.      Palpations: Abdomen is soft.      Tenderness: There is no abdominal tenderness.   Skin:     General: Skin is warm and dry.   Neurological:      Mental Status: He is alert and oriented to person, place, and time.      Cranial Nerves: No cranial nerve deficit.   Psychiatric:         Mood and Affect: Mood and affect normal.         Behavior: Behavior normal.         Thought Content: Thought content normal.         Judgment: Judgment normal.                       Assessment and Plan   Diagnoses and all orders for this visit:    1. Annual physical exam (Primary)  Comments:  Daily exercise and healthy diet recommended  Orders:  -     TSH Rfx On Abnormal To Free T4; Future  -     CBC & Differential; Future  -     Comprehensive Metabolic Panel; Future  -     Hemoglobin A1c; Future  -     Lipid Panel; Future  -     HIV-1 / O / 2 Ag / Antibody 4th Generation    2. Chronic migraine without aura without status migrainosus, not intractable  Comments:  Mildly worsening, reports symptoms varies but have had 10 migraines in the last 6 months which were very significantly bad.  Orders:  -     MRI Brain Without Contrast; Future  -     amitriptyline (ELAVIL) 25 MG tablet; Take 1 tablet by mouth Every Night.  Dispense: 30 tablet; Refill: 1  -     TSH Rfx On Abnormal To Free T4; Future  -     CBC & Differential; Future  -     Comprehensive Metabolic Panel; Future  -     Hemoglobin A1c; Future  -     Lipid Panel; Future    3. Need for hepatitis C screening test  -      Hepatitis C Antibody; Future  -     TSH Rfx On Abnormal To Free T4; Future  -     CBC & Differential; Future  -     Comprehensive Metabolic Panel; Future  -     Hemoglobin A1c; Future  -     Lipid Panel; Future    4. Ocular migraine  -     MRI Brain Without Contrast; Future  -     amitriptyline (ELAVIL) 25 MG tablet; Take 1 tablet by mouth Every Night.  Dispense: 30 tablet; Refill: 1  -     TSH Rfx On Abnormal To Free T4; Future  -     CBC & Differential; Future  -     Comprehensive Metabolic Panel; Future  -     Hemoglobin A1c; Future  -     Lipid Panel; Future       Due to some very bad episodes; patient agrees to start prophylactic amitriptyline as a trial for next few months.  I have also ordered MRI to rule out any other possibilities in light of worsening migraines in last 6 months.  We will consider neurologist if needed, patient to call me if no improvement or any worsening.  Amitriptyline side effects discussed.        Tobacco Use: Low Risk    • Smoking Tobacco Use: Never Smoker   • Smokeless Tobacco Use: Never Used            Follow Up   Return in about 3 months (around 11/18/2022) for Recheck, Next scheduled follow up.  Patient was given instructions and counseling regarding his condition or for health maintenance advice. Please see specific information pulled into the AVS if appropriate.

## 2022-09-06 ENCOUNTER — TELEPHONE (OUTPATIENT)
Dept: FAMILY MEDICINE CLINIC | Facility: CLINIC | Age: 32
End: 2022-09-06

## 2022-09-06 NOTE — TELEPHONE ENCOUNTER
Caller: Mainor Momin    Relationship: Self    Best call back number: [phone]     What form or medical record are you requesting: FMLA     Who is requesting this form or medical record from you: [employer] (WORK)     How would you like to receive the form or medical records (pick-up, mail, fax):       FAX:  [phone]    Timeframe paperwork needed: ASAP     Additional notes:     PATIENT JOB IS REQUESTING TO COMPLETE CERTIFICATION FORM  REGARDING FMLA PAPERWORK

## 2022-09-06 NOTE — TELEPHONE ENCOUNTER
Patient is aware that form has been faxed 3 times and the original was left up front for him to . I faxed form again for patient and he stated that he will come pick the original form up tomorrow 9/7/2022

## 2022-10-14 ENCOUNTER — TELEPHONE (OUTPATIENT)
Dept: FAMILY MEDICINE CLINIC | Facility: CLINIC | Age: 32
End: 2022-10-14

## 2022-10-14 NOTE — TELEPHONE ENCOUNTER
HUB READ OR SHARE       LVM FOR PATIENT TO DO LABS THAT HAVE BEEN ORDERED OR TO CALL US BACK SO WE CAN CANCEL THEM

## 2022-11-09 ENCOUNTER — OFFICE VISIT (OUTPATIENT)
Dept: FAMILY MEDICINE CLINIC | Facility: CLINIC | Age: 32
End: 2022-11-09

## 2022-11-09 VITALS
TEMPERATURE: 98 F | SYSTOLIC BLOOD PRESSURE: 104 MMHG | HEART RATE: 85 BPM | DIASTOLIC BLOOD PRESSURE: 72 MMHG | OXYGEN SATURATION: 98 % | RESPIRATION RATE: 19 BRPM

## 2022-11-09 DIAGNOSIS — J02.9 SORE THROAT: Primary | ICD-10-CM

## 2022-11-09 DIAGNOSIS — H69.81 DYSFUNCTION OF RIGHT EUSTACHIAN TUBE: ICD-10-CM

## 2022-11-09 LAB
EXPIRATION DATE: NORMAL
INTERNAL CONTROL: NORMAL
Lab: NORMAL
S PYO AG THROAT QL: NEGATIVE

## 2022-11-09 PROCEDURE — 69209 REMOVE IMPACTED EAR WAX UNI: CPT

## 2022-11-09 PROCEDURE — 99213 OFFICE O/P EST LOW 20 MIN: CPT

## 2022-11-09 PROCEDURE — 87880 STREP A ASSAY W/OPTIC: CPT

## 2022-11-09 RX ORDER — PREDNISONE 50 MG/1
50 TABLET ORAL DAILY
Qty: 5 TABLET | Refills: 0 | Status: SHIPPED | OUTPATIENT
Start: 2022-11-09

## 2022-11-09 NOTE — PROGRESS NOTES
Mainor Momin presents to [facility] with complaints of severe sore throat, sinus pressure/pain, ear itchiness/fullness.      History of Present Illness  This is a 32-year-old male who presents to clinic with complaints of severe sore throat, sinus pressure/pain, ear itchiness/fullness.    States symptoms originally started on Monday, states that he was actually eating some bread like substance, choked on the bread like substance, did not have any water or liquid nearby to help, but was eventually able to get a liquid and clear his throat where he was choking on the bread.  States that that evening he noticed that his throat was starting to be sore, and the next day when he went to go eat spicy food, he states that he exacerbated in his sore throat got worse.  Does admit to some sinus pressure/pain as well, also admits to some left-sided ear fullness and some right-sided ear fullness with associated pain.  States that he has tried several medications over-the-counter, not got a lot of relief.  Denies any fever/chills/body aches.  Denies any GI symptoms.  Denies any shortness of breath or chest pain or cough.    The following portions of the patient's history were personally reviewed and updated as appropriate: allergies, current medications, past medical history, past surgical history, past family history, and past social history.       Objective   Vital Signs:   /72   Pulse 85   Temp 98 °F (36.7 °C)   Resp 19   SpO2 98%     There is no height or weight on file to calculate BMI.      All labs, imaging, test results, and specialty provider notes reviewed with patient.     Physical Exam  Vitals reviewed.   Constitutional:       Appearance: Normal appearance.   HENT:      Right Ear: Tympanic membrane is bulging.      Left Ear: There is impacted cerumen.   Cardiovascular:      Rate and Rhythm: Normal rate and regular rhythm.      Pulses: Normal pulses.      Heart sounds:  Normal heart sounds.   Pulmonary:      Effort: Pulmonary effort is normal.      Breath sounds: Normal breath sounds.   Neurological:      General: No focal deficit present.      Mental Status: He is alert and oriented to person, place, and time.        Ear Cerumen Removal    Date/Time: 11/9/2022 3:51 PM  Performed by: Magy Del Castillo APRN  Authorized by: Magy Del Castillo APRN     Anesthesia:  Local Anesthetic: none  Location details: left ear  Patient tolerance: patient tolerated the procedure well with no immediate complications  Procedure type: irrigation   Sedation:  Patient sedated: no            Assessment and Plan:  Diagnoses and all orders for this visit:    1. Sore throat (Primary)  -     predniSONE (DELTASONE) 50 MG tablet; Take 1 tablet by mouth Daily.  Dispense: 5 tablet; Refill: 0  -     POC Rapid Strep A    2. Dysfunction of right eustachian tube  -     predniSONE (DELTASONE) 50 MG tablet; Take 1 tablet by mouth Daily.  Dispense: 5 tablet; Refill: 0    Other orders  -     Ear Cerumen Removal      Upon exam, patient did have earwax impacted into the left ear, also had some fluid sitting back behind his right eardrum, so we will go ahead and treat with a 5-day course of prednisone.  This should also help with his sore throat, also instructed him that he can start an antihistamine over-the-counter, did go ahead and test him for strep as well which came back negative.  So we will hold off on antibiotics at this time.  Patient verbalized understanding of treatment plan.    Follow Up:  No follow-ups on file.    Patient was given instructions and counseling regarding his condition or for health maintenance advice. Please see specific information pulled into the AVS if appropriate.

## 2022-11-16 DIAGNOSIS — G43.709 CHRONIC MIGRAINE WITHOUT AURA WITHOUT STATUS MIGRAINOSUS, NOT INTRACTABLE: ICD-10-CM

## 2022-11-16 DIAGNOSIS — G43.109 OCULAR MIGRAINE: ICD-10-CM

## 2022-11-16 RX ORDER — AMITRIPTYLINE HYDROCHLORIDE 25 MG/1
25 TABLET, FILM COATED ORAL NIGHTLY
Qty: 30 TABLET | Refills: 1 | Status: SHIPPED | OUTPATIENT
Start: 2022-11-16

## 2023-06-07 DIAGNOSIS — G43.709 CHRONIC MIGRAINE WITHOUT AURA WITHOUT STATUS MIGRAINOSUS, NOT INTRACTABLE: ICD-10-CM

## 2023-06-07 DIAGNOSIS — G43.109 OCULAR MIGRAINE: ICD-10-CM

## 2023-06-07 RX ORDER — AMITRIPTYLINE HYDROCHLORIDE 25 MG/1
25 TABLET, FILM COATED ORAL NIGHTLY
Qty: 30 TABLET | Refills: 1 | Status: SHIPPED | OUTPATIENT
Start: 2023-06-07

## (undated) DEVICE — SOL IRRG H2O PL/BG 1000ML STRL

## (undated) DEVICE — Device: Brand: DEFENDO AIR/WATER/SUCTION AND BIOPSY VALVE

## (undated) DEVICE — COLON KIT: Brand: MEDLINE INDUSTRIES, INC.